# Patient Record
Sex: FEMALE | Race: WHITE
[De-identification: names, ages, dates, MRNs, and addresses within clinical notes are randomized per-mention and may not be internally consistent; named-entity substitution may affect disease eponyms.]

---

## 2018-12-23 ENCOUNTER — HOSPITAL ENCOUNTER (EMERGENCY)
Dept: HOSPITAL 25 - UCCORT | Age: 28
Discharge: LEFT BEFORE DISCHARGE | End: 2018-12-23
Payer: SELF-PAY

## 2018-12-23 VITALS — DIASTOLIC BLOOD PRESSURE: 75 MMHG | SYSTOLIC BLOOD PRESSURE: 140 MMHG

## 2018-12-23 DIAGNOSIS — I20.9: Primary | ICD-10-CM

## 2018-12-23 PROCEDURE — G0463 HOSPITAL OUTPT CLINIC VISIT: HCPCS

## 2018-12-23 PROCEDURE — 99202 OFFICE O/P NEW SF 15 MIN: CPT

## 2018-12-23 PROCEDURE — 93005 ELECTROCARDIOGRAM TRACING: CPT

## 2020-06-27 NOTE — UC
Cardiac HPI





- HPI Summary


HPI Summary: 


Patient  is a 28 year old female , who  present today  to the urgent care with 

chest pain for 4 days intermittently.


Reports that pain is substernal and radiates to the back, comes on 

intermittently and last for about 15 minutes at a time.There is associated 

nausea.


She denies any recent viral illness or any  high impact activity that could 

have left to any chest discomfort. 


There is associated shortness of breath.


She was seen at an Urgent care in [place]  for same complaint, was 

diagnosed with acid reflux, taking tums with no relief


Denies any No sick contacts . No skin rash. 


Denies any fever, chills or cough.  No diarrhea or constipation














- History of Current Complaint


Chief Complaint: UCChestPain


Stated Complaint: CHEST PRESSURE


Time Seen by Provider: 12/23/18 20:02


Hx Obtained From: Patient


Hx Last Menstrual Period: 12/7/18


Pain Intensity: 5





- Allergy/Home Medications


Allergies/Adverse Reactions: 


 Allergies











Allergy/AdvReac Type Severity Reaction Status Date / Time


 


No Known Allergies Allergy   Verified 12/23/18 20:04











Home Medications: 


 Home Medications





Calcium Carbonate CHEW TAB* [Tums*] 500 mg PO BID 12/23/18 [History Confirmed 12 /23/18]


Naproxen Sodium [Aleve] 220 mg PO DAILY 12/23/18 [History Confirmed 12/23/18]











PMH/Surg Hx/FS Hx/Imm Hx





- Additional Past Medical History


Additional PMH: 


No significant past medical history


IUD placement





Previously Healthy: Yes





- Surgical History


Surgical History: None





- Social History


Alcohol Use: None


Substance Use Type: None


Smoking Status (MU): Never Smoked Tobacco





Review of Systems


All Other Systems Reviewed And Are Negative: Yes


Constitutional: Positive: Negative


Skin: Positive: Negative


Eyes: Positive: Negative


ENT: Positive: Negative


Respiratory: Positive: Shortness Of Breath.  Negative: Cough


Cardiovascular: Positive: Chest Pain


Gastrointestinal: Positive: Negative


Genitourinary: Positive: Negative


Motor: Positive: Negative


Neurovascular: Positive: Negative


Musculoskeletal: Positive: Negative


Neurological: Positive: Negative


Psychological: Positive: Negative


Is Patient Immunocompromised?: No





Physical Exam





- Summary


Physical Exam Summary: 


Physical Exam: 


Const: Appears well. No signs of apparent distress present. Alert and oriented 

x 3.


Musculo: Walks with a normal gait.


Head/Face: Atraumatic, normocephalic on inspection.


Eyes: EOMI and PERRLA  in both eyes. Conjunctivae clear. No discharge noted 


ENT: Hearing normal, TM normal appearing bilaterally . 


Respiratory: Respirations are unlabored.  Lungs clear to auscultation 

bilaterally, no  wheezing , rhonchi or rales noted . 


CVS:  Regular rate and Rhythm, S1S2 normal , no murmurs identified. 


Extremities: Peripheral circulation is grossly normal. Pulses 2+


Abdomen : Soft non tender ,  nondistended ,  Bowel sounds present .  No 

guarding , rebound tenderness  or  rigidity noted. 


Skin: No lesions or rash located on the upper extremities or on the lower 

extremities. 


Neuro: Cranial nerves  II to XII intact, motor and sensory intact.  DTR Intact  

bilaterally. Mood is normal. Affect is normal.








Triage Information Reviewed: Yes


Vital Signs: 


 Initial Vital Signs











Temp  98.0 F   12/23/18 20:00


 


Pulse  81   12/23/18 20:00


 


Resp  18   12/23/18 20:00


 


BP  140/75   12/23/18 20:00


 


Pulse Ox  100   12/23/18 20:00











Vital Signs Reviewed: Yes





Diagnostics





- EKG


Cardiac Rate: NL


Cardiac Rhythm: Sinus: Normal


Ectopy: None


Summary of EKG Findings: Normal sinus rhythm, normal ND and QRS interval, 

normal axis.  No ST elevation.  ST depression noted in V2, V4 and V5.  T-wave 

inversions are noted in V1 to V3 with flattening of T waves in V4 to V6.  No 

hypertrophy noted





- Assessment/Plan


Course Of Treatment: During the visit today,  we  obtained  EKG which 

demonstrated T-wave inversions and ST segment depressions .  I advised transfer 

her to ER via ambulance but patient declined and want to go in private car.  I 

believe that the patient's symptoms are cardiac related .  I discussed this at 

length with her and her  present.  Risk includes death.  He verbalizes 

understanding of potential diagnosis and risks.  I strongly advised her to be 

transferred via ambulance to ER and further evaluation and treatment there.  

They  refused to go to the ED via ambulance but will go in private car. Patient 

signed out AMA.  Report called to the ER provider( ) at [facility] , advised provider  of the history, physical examination

, and duration of illness and ekg findings  so far and the need for definitive 

management.





- Differential Diagnoses - Chest Pain


Differential Diagnosis/HQI/PQRI: ACS, Angina





- Clinical Impression


Provider Diagnosis: 


 Angina at rest








Discharge





- Sign-Out/Discharge


Documenting (check all that apply): Patient Departure


All imaging exams completed and their final reports reviewed: No Studies





- Discharge Plan


Condition: Stable


Disposition: AGAINST MEDICAL ADVICE


Referrals: 


No Primary Care Phys,NOPCP [Primary Care Provider] - 


Additional Instructions: 


Patient needs additional testing, thus ER transfer advised  via ambulance. 

Patient  prefers to go via private car, signed out  AMA.  Report called to the 

ER provider( ) at [facility]. Advised provider  

of the history, physical examination, and duration of illness and ekg findings  

so far and the need for definitive management.








- Billing Disposition and Condition


Condition: STABLE


Disposition: Against Medical Advice MILD